# Patient Record
Sex: MALE | Race: WHITE | NOT HISPANIC OR LATINO | Employment: OTHER | URBAN - METROPOLITAN AREA
[De-identification: names, ages, dates, MRNs, and addresses within clinical notes are randomized per-mention and may not be internally consistent; named-entity substitution may affect disease eponyms.]

---

## 2017-05-16 PROBLEM — Z79.01 ANTICOAGULATED: Status: ACTIVE | Noted: 2017-05-16

## 2018-06-08 ENCOUNTER — HOSPITAL ENCOUNTER (OUTPATIENT)
Dept: LAB | Facility: MEDICAL CENTER | Age: 48
End: 2018-06-08
Attending: PSYCHIATRY & NEUROLOGY
Payer: COMMERCIAL

## 2018-06-08 ENCOUNTER — OFFICE VISIT (OUTPATIENT)
Dept: NEUROLOGY | Facility: MEDICAL CENTER | Age: 48
End: 2018-06-08
Payer: COMMERCIAL

## 2018-06-08 VITALS
OXYGEN SATURATION: 95 % | WEIGHT: 283.18 LBS | RESPIRATION RATE: 16 BRPM | HEIGHT: 76 IN | SYSTOLIC BLOOD PRESSURE: 118 MMHG | BODY MASS INDEX: 34.48 KG/M2 | TEMPERATURE: 97.1 F | DIASTOLIC BLOOD PRESSURE: 76 MMHG | HEART RATE: 64 BPM

## 2018-06-08 DIAGNOSIS — G25.0 ESSENTIAL TREMOR: Primary | ICD-10-CM

## 2018-06-08 DIAGNOSIS — G25.0 ESSENTIAL TREMOR: ICD-10-CM

## 2018-06-08 LAB
ERYTHROCYTE [SEDIMENTATION RATE] IN BLOOD BY WESTERGREN METHOD: 4 MM/HOUR (ref 0–15)
FOLATE SERPL-MCNC: 23.5 NG/ML
TSH SERPL DL<=0.005 MIU/L-ACNC: 0.53 UIU/ML (ref 0.38–5.33)
VIT B12 SERPL-MCNC: 442 PG/ML (ref 211–911)

## 2018-06-08 PROCEDURE — 82746 ASSAY OF FOLIC ACID SERUM: CPT

## 2018-06-08 PROCEDURE — 36415 COLL VENOUS BLD VENIPUNCTURE: CPT

## 2018-06-08 PROCEDURE — 85652 RBC SED RATE AUTOMATED: CPT

## 2018-06-08 PROCEDURE — 99205 OFFICE O/P NEW HI 60 MIN: CPT | Performed by: PSYCHIATRY & NEUROLOGY

## 2018-06-08 PROCEDURE — 82607 VITAMIN B-12: CPT

## 2018-06-08 PROCEDURE — 84443 ASSAY THYROID STIM HORMONE: CPT

## 2018-06-08 ASSESSMENT — ENCOUNTER SYMPTOMS
SPEECH CHANGE: 0
SENSORY CHANGE: 0
LOSS OF CONSCIOUSNESS: 0
TINGLING: 0
TREMORS: 1
MEMORY LOSS: 0
CONSTIPATION: 0
DEPRESSION: 0
FALLS: 0

## 2018-06-08 ASSESSMENT — PATIENT HEALTH QUESTIONNAIRE - PHQ9: CLINICAL INTERPRETATION OF PHQ2 SCORE: 0

## 2018-06-08 NOTE — PROGRESS NOTES
Subjective:      Cuco Ortiz is a 48 y.o. male who presents for consultation, from the office of Dr. Kerr, with a history of bilateral hand tremor.     NATALIA Norwood is a pleasant 48-year-old right-handed  gentleman who began to notice tremulousness of the hands about 2 years ago, and which seems to be fluctuating though overall seems to be getting worse. Mostly inaction based process, he is clearly aware of it when using the hands, especially with static positioning and holding objects, the heavier the worse. It attenuates completely when he is at rest or relaxed. It is more apparent when he is quite fatigued or tired, especially when he is very stressed. He describes a higher frequency tremulousness, the amplitude will vary depending on severity. He denies any loss of dexterity or weakness with the hands. There was no involvement of the head, neck or voice, he denies any internalized sense of tremulousness. He does not drink, so he is not clear about any effect with alcohol, caffeine seems to have no effect one way or the other. There has been no change in his medication regimen recently since things started.    He denies headache, vertigo, double vision or visual loss, slurring of speech, reduced voice volume, excessive drooling or anosmia, food does go down the right tube when he swallows, balance is stable, he denies any changes in stride length with shuffling, etc. He also denies issues with orthostatic dizziness and syncope, early satiety with nausea and vomiting, excessive constipation or urinary retention.    Routine blood work drawn in the last couple of months including CBC and CMP were unremarkable, lipid profile as well. TSH has not been drawn. An electronic health record, there is no indication of recent imaging. He has not been treated.    He has a medical history of DVT, hypertension, dyslipidemia, GERD, obesity and migraine as a teenager. There is no history of documented MS, CVA,  "thyroid disease, malignancy, anemia, liver or kidney disease, blood dyscrasia, neurodegenerative disease, seizures, psychiatric disease or autoimmune disease. There is no surgical history of note. His father may suffer from tremor, but his mother and his 2 siblings do not. He does not drink or smoke.    He is on Xaralto, Prilosec, Pravachol, Benicar, vitamin D, and several supplements including fish oil and glucosamine chondroitin sulfate.    Review of Systems   Constitutional: Negative for malaise/fatigue.   Gastrointestinal: Negative for constipation.   Musculoskeletal: Negative for falls.   Neurological: Positive for tremors. Negative for tingling, sensory change, speech change and loss of consciousness.   Psychiatric/Behavioral: Negative for depression and memory loss.   All other systems reviewed and are negative.       Objective:     /76   Pulse 64   Temp 36.2 °C (97.1 °F)   Resp 16   Ht 1.93 m (6' 4\")   Wt (!) 128.4 kg (283 lb 2.9 oz)   SpO2 95%   BMI 34.47 kg/m²      Physical Exam    He appears in no acute distress. His vital signs are stable. There is no malar rash, sialorrhea, temporal or jaw tenderness, or jaw claudication. The neck is supple, range of motion is full, carotid pulses are present without asymmetry. Cardiac evaluation reveals a regular rhythm without murmur, rub, or gallop, there is no evidence of diffuse joint swelling or tenderness, rash, or bruising.    Mental status exam reveals him to be fully oriented, that show evidence of focal cognitive or language deficits.    PERRLA/EOMI, visual fields are full, funduscopic exam reveals sharp disc margins, facial movements are symmetric without bradykinesia/tachyphemia and hypophonia. Digital exam is intact to temperature and light touch, the tongue and uvular midline, shoulder shrug and head rotation are intact.    Tonus normal bilaterally, musculoskeletal exam reveals no evidence of asterixis or drift. A tremor is seen with the " upper extremities with a higher frequency but low amplitude, typically with action or with sustained position against gravity. Strength is still 5/5 in all 4 extremities, in all muscle groups, reflexes are brisk and present at all points including the ankles bilaterally, there are no asymmetries, both toes are downgoing.     There is no appendicular dystaxia, tremor is seen through a full range of motion, it does not increase as he approaches the target. It attenuates at rest. Repetitive movements show good amplitude and frequency throughout, he walks with normal station, there is no postural instability, he turns easily, arm swing is symmetric, stride length is maintained that he does not shuffle.    Sensory exam is intact to vibration and temperature. Romberg is absent.     Assessment/Plan:     1. Essential tremor  I suspect he does suffer from essential tremor, though the early onset is more consistent with a hereditary process, and I wonder if in fact his father does suffer from the same condition. He was told to do little  work and asked other family members about themselves. The quality of the tremor, its action based circumstance are all consistent with this process. This is not Parkinson's disease, he lacks the stigmata on examination as well. Some blood work to be drawn to rule out underlying medical conditions with symptomatic tremor, this is not medication effect.    Face-to-face time was spent reviewing all the above. We'll follow-up in one year since the tremor itself is mild, he does not wish to be treated at this time, but was also told that this is a condition that will progress slowly over many years, but this progression cannot be predicted as to severity and rapidity, but we can still wait to treat until the symptoms have become dysfunctional. The nature of essential tremor, its evolution over time, the differences between this and Parkinson's disease or other degenerative illnesses, all  reviewed in full. Some blood work will be drawn to be thorough since some medical conditions that are associated with tremor can easily be treated.    - WESTERGREN SED RATE; Future  - TSH; Future  - VITAMIN B12; Future  - FOLATE; Future    Time: Evaluation of 60 minutes for exam, review, discussion, and education  Discussion: As mentioned in the assessment,

## 2018-07-20 PROBLEM — E66.9 OBESITY (BMI 30.0-34.9): Status: ACTIVE | Noted: 2018-07-20

## 2021-11-23 PROBLEM — Z20.822 EXPOSURE TO COVID-19 VIRUS: Status: ACTIVE | Noted: 2021-11-23

## 2022-05-30 PROBLEM — E78.41 ELEVATED LIPOPROTEIN(A): Status: ACTIVE | Noted: 2022-05-30

## 2022-11-30 PROBLEM — Z20.822 EXPOSURE TO COVID-19 VIRUS: Status: RESOLVED | Noted: 2021-11-23 | Resolved: 2022-11-30

## 2022-11-30 PROBLEM — E66.9 OBESITY (BMI 30.0-34.9): Status: RESOLVED | Noted: 2018-07-20 | Resolved: 2022-11-30
